# Patient Record
Sex: FEMALE | Employment: OTHER
[De-identification: names, ages, dates, MRNs, and addresses within clinical notes are randomized per-mention and may not be internally consistent; named-entity substitution may affect disease eponyms.]

---

## 2024-02-21 ENCOUNTER — TRANSCRIBE ORDERS (OUTPATIENT)
Dept: OTHER | Age: 64
End: 2024-02-21

## 2024-02-21 DIAGNOSIS — M25.812 SHOULDER IMPINGEMENT, LEFT: Primary | ICD-10-CM

## 2024-03-04 ENCOUNTER — THERAPY VISIT (OUTPATIENT)
Dept: PHYSICAL THERAPY | Facility: CLINIC | Age: 64
End: 2024-03-04
Attending: ORTHOPAEDIC SURGERY
Payer: COMMERCIAL

## 2024-03-04 DIAGNOSIS — G89.29 CHRONIC LEFT SHOULDER PAIN: Primary | ICD-10-CM

## 2024-03-04 DIAGNOSIS — M25.812 SHOULDER IMPINGEMENT, LEFT: ICD-10-CM

## 2024-03-04 DIAGNOSIS — M25.512 CHRONIC LEFT SHOULDER PAIN: Primary | ICD-10-CM

## 2024-03-04 PROCEDURE — 97110 THERAPEUTIC EXERCISES: CPT | Mod: GP | Performed by: PHYSICAL THERAPIST

## 2024-03-04 PROCEDURE — 97161 PT EVAL LOW COMPLEX 20 MIN: CPT | Mod: GP | Performed by: PHYSICAL THERAPIST

## 2024-03-04 NOTE — PROGRESS NOTES
PHYSICAL THERAPY EVALUATION  Type of Visit: Evaluation    See electronic medical record for Abuse and Falls Screening details.    Subjective       Presenting condition or subjective complaint: Pt reports that her L shoulder pain began about 2 months ago. [facility] and had an injection in her L shoulder and she reports that it is considerably better. She notes she has sx in both her R and L (and Jeremie believes some of it can be caused from her neck too) and she has a history of neck issues. 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  Date of onset: 02/20/24    Relevant medical history: Arthritis   Dates & types of surgery: R shoulder impingement scope in 2000 and gall bladder removed in 2022 and tubes tied in 1998    Prior diagnostic imaging/testing results: X-ray     Prior therapy history for the same diagnosis, illness or injury: No        Living Environment  Social support: With a significant other or spouse   Type of home: House   Stairs to enter the home:         Ramp:     Stairs inside the home:         Help at home: None  Equipment owned:       Employment: No retired  Hobbies/Interests: pickle ball, broomball, skiing, biking,  track and volleyball    Patient goals for therapy: sleep, put on a jacket without pain    Pain assessment: See objective evaluation for additional pain details     Objective   Pain:   Location: anterior, lateral, and UT  At rest: 0/10  With activity: 8/10  Quality: dull ache, sharp,   Frequency: Intermittent  Time of Day: worse in pm and at night but was also very stiff in the am but it's been better since the injection  Pain is exacerbated by: reaching overhead, behind back, lying on shoulder and lifting/carrying and skiing, and sometimes volleyball (setting overhead and throwing and arm circles) and any quick, jerking motion, and riding bike.  Pain is relieved by: rest, ice, anti-inflammatories     Range of  Motion:    AROM L shoulder: 150/125/T8/80     AROM R shoulder: 150/120/T8/75  Strength:  R shoulder: 5/5/5/5    L shoulder: 4+/4- with 5/10 pain/5/4+  Notable mechanics: L scapular medial border prominence, winging and upward translation with abduction.   Special Tests: + scapular assist test on L  Palpation: Tension and hypertonicity noted at L UT, LS, rhomboids  Posture: Fwd head, rounded shoulders and elevated scapula on L    Cervical repeated motions:   Repeated retraction: no effect during, improved AROM and less pain 120 vs 150 with ABD afterwards.              Assessment & Plan   CLINICAL IMPRESSIONS  Medical Diagnosis: L shoulder impingement    Treatment Diagnosis: L shoulder pain   Impression/Assessment: Patient is a 64 year old female with L shoulder and neck complaints.  The following significant findings have been identified: Pain, Decreased ROM/flexibility, Decreased joint mobility, and Decreased strength. These impairments interfere with their ability to perform self care tasks, recreational activities, and household chores as compared to previous level of function.     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   Patient/Family/caregiver agrees with Plan of Care.     Evaluation Time:     PT Eval, Low Complexity Minutes (45992): 20       Signing Clinician: Keara Byrd PT      [facility]                                                                                   OUTPATIENT PHYSICAL THERAPY      PLAN OF TREATMENT FOR OUTPATIENT REHABILITATION   Patient's Last Name, First Name, LORENZONegar Avendaño YOB: 1960   Provider's Name   [facility]   Medical Record No.  [id number]     Onset Date: 02/20/24  Start of Care Date: 03/04/24     Medical Diagnosis:  L shoulder impingement      PT Treatment Diagnosis:  L shoulder pain Plan of Treatment  Frequency/Duration: 1x/wk for 4 weeks then every other week for 8 weeks/ 12 weeks    Certification date from 03/04/24 to 05/27/24         See note for plan of treatment details and functional goals         Keara Briceño PT                         I CERTIFY THE NEED FOR THESE SERVICES FURNISHED UNDER        THIS PLAN OF TREATMENT AND WHILE UNDER MY CARE .             Physician Signature               Date    X_____________________________________________________                  Referring Provider:  Kevin Chao    Initial Assessment  See Epic Evaluation- Start of Care Date: 03/04/24

## 2024-03-04 NOTE — PROGRESS NOTES
[facility]                                                                                   OUTPATIENT PHYSICAL THERAPY    PLAN OF TREATMENT FOR OUTPATIENT REHABILITATION   Patient's Last Name, First Name, Negar Chow YOB: 1960   Provider's Name   [facility]   Medical Record No.  [id number]     Onset Date: 02/20/24  Start of Care Date: 03/04/24     Medical Diagnosis:  L shoulder impingement      PT Treatment Diagnosis:  L shoulder pain Plan of Treatment  Frequency/Duration: 1x/wk for 4 weeks then every other week for 8 weeks/ 12 weeks    Certification date from 03/04/24 to 05/27/24         See note for plan of treatment details and functional goals        03/04/24 0500   Appointment Info   Signing clinician's name / credentials Keara Briceño DPT, SCS   Total/Authorized Visits 12 ([facility])   Visits Used 1   Medical Diagnosis L shoulder impingement   PT Tx Diagnosis L shoulder pain   Other 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   Quick Adds Certification   Progress Note/Certification   Start of Care Date 03/04/24   Onset of illness/injury or Date of Surgery 02/20/24   Therapy Frequency 1x/wk for 4 weeks then every other week for 8 weeks   Predicted Duration 12 weeks   Certification date from 03/04/24   Certification date to 05/27/24   PT Goal 1   Goal Identifier reaching   Goal Description improve AROM into ABD without pain to tolerate reaching ot the top shelf of an [place] cabinet 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   Rationale to maximize safety and independence with performance of ADLs and functional tasks;to maximize safety and independence within the home;to maximize safety and independence within the community   Target Date 05/27/24   Treatment Interventions (PT)   Interventions Therapeutic Procedure/Exercise   Therapeutic Procedure/Exercise   Therapeutic  Procedures: strength, endurance, ROM, flexibillity minutes (86252) 15   Patient Response/Progress haydee well, improved AROM from 120 to 150 after retraction but no change in pain with resisted ABD   PTRx Ther Proc 1 Cervical Retraction with Overpressure   PTRx Ther Proc 1 - Details x10 and cervical retraction then tried another set with OP used from PT overpressure   PTRx Ther Proc 2 Soft Tissue work for Upper Quadrant -Theracane   PTRx Ther Proc 2 - Details No Notes   Eval/Assessments   PT Eval, Low Complexity Minutes (43974) 20   Plan   Home program see ptrx   Plan for next session add supine cuff strength   Total Session Time   Timed Code Treatment Minutes 15   Total Treatment Time (sum of timed and untimed services) 35         Keara Briceño, CRYSTALT                         I CERTIFY THE NEED FOR THESE SERVICES FURNISHED UNDER        THIS PLAN OF TREATMENT AND WHILE UNDER MY CARE .             Physician Signature               Date    X_____________________________________________________                  Referring Provider:  Kevin Chao    Initial Assessment  See Epic Evaluation- Start of Care Date: 03/04/24

## 2024-03-08 ENCOUNTER — THERAPY VISIT (OUTPATIENT)
Dept: PHYSICAL THERAPY | Facility: CLINIC | Age: 64
End: 2024-03-08
Payer: COMMERCIAL

## 2024-03-08 DIAGNOSIS — G89.29 CHRONIC LEFT SHOULDER PAIN: Primary | ICD-10-CM

## 2024-03-08 DIAGNOSIS — M25.512 CHRONIC LEFT SHOULDER PAIN: Primary | ICD-10-CM

## 2024-03-08 PROCEDURE — 97110 THERAPEUTIC EXERCISES: CPT | Mod: GP | Performed by: PHYSICAL THERAPIST

## 2024-03-08 PROCEDURE — 97112 NEUROMUSCULAR REEDUCATION: CPT | Mod: GP | Performed by: PHYSICAL THERAPIST

## 2024-03-13 ENCOUNTER — THERAPY VISIT (OUTPATIENT)
Dept: PHYSICAL THERAPY | Facility: CLINIC | Age: 64
End: 2024-03-13
Payer: COMMERCIAL

## 2024-03-13 DIAGNOSIS — M25.512 CHRONIC LEFT SHOULDER PAIN: Primary | ICD-10-CM

## 2024-03-13 DIAGNOSIS — G89.29 CHRONIC LEFT SHOULDER PAIN: Primary | ICD-10-CM

## 2024-03-13 PROCEDURE — 97140 MANUAL THERAPY 1/> REGIONS: CPT | Mod: GP | Performed by: PHYSICAL THERAPIST

## 2024-03-13 PROCEDURE — 97110 THERAPEUTIC EXERCISES: CPT | Mod: GP | Performed by: PHYSICAL THERAPIST

## 2024-03-13 PROCEDURE — 97112 NEUROMUSCULAR REEDUCATION: CPT | Mod: GP | Performed by: PHYSICAL THERAPIST

## 2024-03-18 ENCOUNTER — THERAPY VISIT (OUTPATIENT)
Dept: PHYSICAL THERAPY | Facility: CLINIC | Age: 64
End: 2024-03-18
Payer: COMMERCIAL

## 2024-03-18 DIAGNOSIS — M25.512 CHRONIC LEFT SHOULDER PAIN: Primary | ICD-10-CM

## 2024-03-18 DIAGNOSIS — G89.29 CHRONIC LEFT SHOULDER PAIN: Primary | ICD-10-CM

## 2024-03-18 PROCEDURE — 97112 NEUROMUSCULAR REEDUCATION: CPT | Mod: GP | Performed by: PHYSICAL THERAPIST

## 2024-03-18 PROCEDURE — 97110 THERAPEUTIC EXERCISES: CPT | Mod: GP | Performed by: PHYSICAL THERAPIST

## 2024-04-04 ENCOUNTER — THERAPY VISIT (OUTPATIENT)
Dept: PHYSICAL THERAPY | Facility: CLINIC | Age: 64
End: 2024-04-04
Payer: COMMERCIAL

## 2024-04-04 DIAGNOSIS — G89.29 CHRONIC LEFT SHOULDER PAIN: Primary | ICD-10-CM

## 2024-04-04 DIAGNOSIS — M25.512 CHRONIC LEFT SHOULDER PAIN: Primary | ICD-10-CM

## 2024-04-04 PROCEDURE — 97112 NEUROMUSCULAR REEDUCATION: CPT | Mod: GP | Performed by: PHYSICAL THERAPIST

## 2024-04-04 PROCEDURE — 97110 THERAPEUTIC EXERCISES: CPT | Mod: GP | Performed by: PHYSICAL THERAPIST

## 2024-05-07 ENCOUNTER — THERAPY VISIT (OUTPATIENT)
Dept: PHYSICAL THERAPY | Facility: CLINIC | Age: 64
End: 2024-05-07
Payer: COMMERCIAL

## 2024-05-07 DIAGNOSIS — G89.29 CHRONIC LEFT SHOULDER PAIN: Primary | ICD-10-CM

## 2024-05-07 DIAGNOSIS — M25.512 CHRONIC LEFT SHOULDER PAIN: Primary | ICD-10-CM

## 2024-05-07 PROCEDURE — 97110 THERAPEUTIC EXERCISES: CPT | Mod: GP | Performed by: PHYSICAL THERAPIST

## 2024-05-07 PROCEDURE — 97112 NEUROMUSCULAR REEDUCATION: CPT | Mod: GP | Performed by: PHYSICAL THERAPIST

## 2024-05-07 NOTE — PROGRESS NOTES
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   Quick Adds Certification   Progress Note/Certification   Start of Care Date 03/04/24   Onset of illness/injury or Date of Surgery 02/20/24   Therapy Frequency 1x/wk for 4 weeks then every other week for 8 weeks   Predicted Duration 12 weeks   Certification date from 03/04/24   Certification date to 05/27/24   PT Goal 1   Goal Identifier reaching   Goal Description improve AROM into ABD without pain to tolerate reaching ot the top shelf of an [place] cabinet 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   Rationale to maximize safety and independence with performance of ADLs and functional tasks;to maximize safety and independence within the home;to maximize safety and independence within the community   Goal Progress goal met   Target Date 05/27/24   Date Met 05/07/24   Subjective Report   Subjective Report she feels like she is not progressing well on the ball   Objective Measures   Objective Measures Objective Measure 1   Objective Measure 1   Objective Measure AROM full and pain free today, only a slight tension at end range flexion and still needs cuing not to hike with UT when bent over on ball or with horizontal ABD   Details \   Treatment Interventions (PT)   Interventions Therapeutic Procedure/Exercise;Neuromuscular Re-education;Manual Therapy   Therapeutic Procedure/Exercise   Therapeutic Procedures: strength, endurance, ROM, flexibility minutes (35151) 23   PTRx Ther Proc 1 Cervical Retraction with Overpressure   PTRx Ther Proc 1 - Details 2 x 10 with cues to use both hands pressure only at end of movement=dec pain L shoulder abduction after 2 x 10 with clinician  overpressure=NE/unable to reproduce shoulder pain after   PTRx Ther Proc 2 Thoracic Extension   PTRx Ther Proc 2 - Details x10   PTRx Ther Proc 3 Wand Shoulder Flexion in Standing   PTRx Ther Proc 3 - Details 10   PTRx Ther Proc 4 Four Corner Stretch Flexion   PTRx Ther Proc 4 - Details 10   PTRx Ther Proc 5 Pec Stretch Doorway   PTRx Ther Proc 5 - Details 10   PTRx Ther Proc 6 Soft Tissue work for Upper Quadrant -Theracane   PTRx Ther Proc 6 - Details HEP   PTRx Ther Proc 7 Shoulder External Rotation Sidelying   PTRx Ther Proc 7 - Details 6 oz x 30   PTRx Ther Proc 8 Ball Prone Scapula I   PTRx Ther Proc 8 - Details 15   PTRx Ther Proc 9 Shoulder Scaption Full Can   PTRx Ther Proc 9 - Details 15 with back on wall   PTRx Ther Proc 10 Bent Over Rows with Dumbbell   PTRx Ther Proc 10 - Details 10# x 10   PTRx Ther Proc 11 Bicep Curls with Dumbbells   PTRx Ther Proc 11 - Details 5# x 10   PTRx Ther Proc 12 Triceps Kickback with Dumbbell   PTRx Ther Proc 12 - Details 3# x 10   Neuromuscular Re-education   Neuromuscular re-ed of mvmt, balance, coord, kinesthetic sense, posture, proprioception minutes (25712) 15   PTRx Neuro Re-ed 1 Push-Up Plus At Wall   PTRx Neuro Re-ed 1 - Details 15   PTRx Neuro Re-ed 2 Shoulder Extension in Standing   PTRx Neuro Re-ed 2 - Details 1 lb x 20 rev scap dep/retraction   PTRx Neuro Re-ed 3 Shoulder Theraband Rows   PTRx Neuro Re-ed 3 - Details rev   PTRx Neuro Re-ed 4 Shoulder Horizontal Abduction Standing   PTRx Neuro Re-ed 4 - Details 15   Patient Response/Progress haydee well, challenged with UT activation   Skilled Intervention see above   Plan   Home program see ptrx   Plan for next session dc to [facility]P       DISCHARGE  Reason for Discharge: Patient has met all goals.    Equipment Issued: n/a    Discharge Plan: Patient to continue home program.    Referring Provider:  Kevin Chao